# Patient Record
Sex: MALE | Race: WHITE
[De-identification: names, ages, dates, MRNs, and addresses within clinical notes are randomized per-mention and may not be internally consistent; named-entity substitution may affect disease eponyms.]

---

## 2024-01-12 PROBLEM — Z00.00 ENCOUNTER FOR PREVENTIVE HEALTH EXAMINATION: Status: ACTIVE | Noted: 2024-01-12

## 2024-01-16 ENCOUNTER — APPOINTMENT (OUTPATIENT)
Dept: PEDIATRIC ORTHOPEDIC SURGERY | Facility: CLINIC | Age: 44
End: 2024-01-16
Payer: MEDICAID

## 2024-01-16 VITALS — WEIGHT: 235 LBS | HEIGHT: 70 IN | BODY MASS INDEX: 33.64 KG/M2

## 2024-01-16 VITALS — DIASTOLIC BLOOD PRESSURE: 84 MMHG | TEMPERATURE: 96.8 F | SYSTOLIC BLOOD PRESSURE: 127 MMHG

## 2024-01-16 DIAGNOSIS — F19.90 OTHER PSYCHOACTIVE SUBSTANCE USE, UNSPECIFIED, UNCOMPLICATED: ICD-10-CM

## 2024-01-16 DIAGNOSIS — Z82.49 FAMILY HISTORY OF ISCHEMIC HEART DISEASE AND OTHER DISEASES OF THE CIRCULATORY SYSTEM: ICD-10-CM

## 2024-01-16 DIAGNOSIS — Z78.9 OTHER SPECIFIED HEALTH STATUS: ICD-10-CM

## 2024-01-16 DIAGNOSIS — M79.18 MYALGIA, OTHER SITE: ICD-10-CM

## 2024-01-16 DIAGNOSIS — Z80.9 FAMILY HISTORY OF MALIGNANT NEOPLASM, UNSPECIFIED: ICD-10-CM

## 2024-01-16 PROCEDURE — 72100 X-RAY EXAM L-S SPINE 2/3 VWS: CPT

## 2024-01-16 PROCEDURE — 99202 OFFICE O/P NEW SF 15 MIN: CPT

## 2024-01-16 PROCEDURE — 73560 X-RAY EXAM OF KNEE 1 OR 2: CPT | Mod: 26,RT

## 2024-01-16 RX ORDER — DICLOFENAC SODIUM 75 MG/1
75 TABLET, DELAYED RELEASE ORAL TWICE DAILY
Qty: 60 | Refills: 1 | Status: ACTIVE | COMMUNITY
Start: 2024-01-16 | End: 1900-01-01

## 2024-01-16 NOTE — PHYSICAL EXAM
[de-identified] : Exam today reveals a normal stance and gait no limp he has reasonable motion to the lumbar spine mild spasm is noted on both sides and midline no trigger points present the posterior pelvic wall is nontender.  With regards to the right knee full motion to the knee is noted with a small effusion no instability on stress Minor discomfort in the medial compartment he does have a small cyst in the popliteal fossa.  Neuro vas exam is unremarkable there are no tension signs present he is neurologically stable.  Review of x-rays from [facility] right knee 2 views December 23, 2023 mild soft tissue swelling and degenerative changes only.  No loose body/lesion.  X-rays ordered and taken today of the lumbar spine reveal narrowing at L5-S1 with mild spondylosis no lesion

## 2024-01-16 NOTE — HISTORY OF PRESENT ILLNESS
[de-identified] : This 43-year-old healthy gentleman is here for evaluation of 1 month history of nonradiating back pain as well as pain to his right knee.  He has not had any numbness paresthesias motor weakness bladder or bowel dysfunction nor has he had any locking buckling or sensation of instability with regards to the knee.  He did have x-rays of the right knee and a sonogram at [facility] the end of December the sonogram was negative minimal degenerative changes noted.  He is functional past history is noncontributory

## 2024-01-16 NOTE — ASSESSMENT
[FreeTextEntry1] : Impression: Myofascial pain lumbar, internal derangement with Baker's cyst right knee.  This patient has been placed on diclofenac with GI precautions and physical therapy ordered.  He will return at the conclusion of therapy if he is not improving

## 2024-06-04 ENCOUNTER — APPOINTMENT (OUTPATIENT)
Dept: PEDIATRIC ORTHOPEDIC SURGERY | Facility: CLINIC | Age: 44
End: 2024-06-04
Payer: MEDICAID

## 2024-06-04 VITALS — HEIGHT: 70 IN | TEMPERATURE: 97 F | WEIGHT: 235 LBS | BODY MASS INDEX: 33.64 KG/M2

## 2024-06-04 DIAGNOSIS — M23.8X1 OTHER INTERNAL DERANGEMENTS OF RIGHT KNEE: ICD-10-CM

## 2024-06-04 PROCEDURE — 99213 OFFICE O/P EST LOW 20 MIN: CPT | Mod: 25

## 2024-06-04 PROCEDURE — 20610 DRAIN/INJ JOINT/BURSA W/O US: CPT | Mod: RT

## 2024-06-04 NOTE — HISTORY OF PRESENT ILLNESS
[de-identified] : This patient returns he still sore much. This 44-year-old returns she has had persistent right knee pain medially he has been in physical therapy and has been taking nonsteroidals with only partial relief noted.  No locking or popping.    Y

## 2024-06-04 NOTE — ASSESSMENT
[FreeTextEntry1] : Impression: Internal derangement right knee.  Will continue with medications and home exercise program if the continues to be symptomatic MRI will be ordered

## 2024-06-04 NOTE — PHYSICAL EXAM
[de-identified] : Exam today antalgic gait with left moderate effusion good motion no instability pain mainly in the medial compartment as well as the posterior medial joint line with a positive Steinmann maneuver.

## 2024-07-29 ENCOUNTER — APPOINTMENT (OUTPATIENT)
Dept: PEDIATRIC ORTHOPEDIC SURGERY | Facility: CLINIC | Age: 44
End: 2024-07-29